# Patient Record
Sex: FEMALE | Race: WHITE | Employment: FULL TIME | URBAN - METROPOLITAN AREA
[De-identification: names, ages, dates, MRNs, and addresses within clinical notes are randomized per-mention and may not be internally consistent; named-entity substitution may affect disease eponyms.]

---

## 2017-01-24 RX ORDER — SIMVASTATIN 40 MG
TABLET ORAL
Qty: 90 TABLET | Refills: 0 | Status: SHIPPED | OUTPATIENT
Start: 2017-01-24 | End: 2017-04-25

## 2017-01-24 NOTE — TELEPHONE ENCOUNTER
Cholesterol Medications: Refilled per protocol    Protocol Criteria:  · Appointment scheduled in the past 12 months or in the next 3 months  · ALT & LDL on file in the past 12 months  · ALT result < 80  · LDL result <130         Lab Results  Component Valu

## 2017-03-03 ENCOUNTER — TELEPHONE (OUTPATIENT)
Dept: INTERNAL MEDICINE CLINIC | Facility: CLINIC | Age: 62
End: 2017-03-03

## 2017-03-03 DIAGNOSIS — E78.00 PURE HYPERCHOLESTEROLEMIA: Primary | ICD-10-CM

## 2017-03-03 NOTE — TELEPHONE ENCOUNTER
Is requesting to have labs orders mailed out to her home please. Dr. Russel Hsu which labs he wants.

## 2017-03-16 ENCOUNTER — TELEPHONE (OUTPATIENT)
Dept: INTERNAL MEDICINE CLINIC | Facility: CLINIC | Age: 62
End: 2017-03-16

## 2017-04-06 ENCOUNTER — OFFICE VISIT (OUTPATIENT)
Dept: INTERNAL MEDICINE CLINIC | Facility: CLINIC | Age: 62
End: 2017-04-06

## 2017-04-06 VITALS
HEART RATE: 77 BPM | BODY MASS INDEX: 33 KG/M2 | WEIGHT: 179.13 LBS | DIASTOLIC BLOOD PRESSURE: 83 MMHG | SYSTOLIC BLOOD PRESSURE: 143 MMHG | TEMPERATURE: 99 F

## 2017-04-06 DIAGNOSIS — J01.00 ACUTE NON-RECURRENT MAXILLARY SINUSITIS: Primary | ICD-10-CM

## 2017-04-06 PROCEDURE — 99212 OFFICE O/P EST SF 10 MIN: CPT | Performed by: INTERNAL MEDICINE

## 2017-04-06 PROCEDURE — 99214 OFFICE O/P EST MOD 30 MIN: CPT | Performed by: INTERNAL MEDICINE

## 2017-04-06 RX ORDER — BENZONATATE 200 MG/1
200 CAPSULE ORAL 3 TIMES DAILY PRN
Qty: 12 CAPSULE | Refills: 0 | Status: SHIPPED | OUTPATIENT
Start: 2017-04-06 | End: 2017-06-05 | Stop reason: ALTCHOICE

## 2017-04-06 RX ORDER — AMOXICILLIN 875 MG/1
875 TABLET, COATED ORAL 2 TIMES DAILY
Qty: 20 TABLET | Refills: 0 | Status: SHIPPED | OUTPATIENT
Start: 2017-04-06 | End: 2017-04-16

## 2017-04-06 NOTE — PROGRESS NOTES
HPI:    Patient ID: Иван Mendez is a 64year old female. 1. Cough  This is a new problem. Episode onset: 2 weeks. The problem has been unchanged. The problem occurs constantly. The cough is productive of sputum.  Associated symptoms include head PREMARIN 0.3 MG OR TABS 1 TABLET DAILY Disp:  Rfl:      Allergies:  Codeine                     Comment:Other reaction(s): CODEINE  Meperidine                  Comment:Other reaction(s): MEPERIDINE HCL  No Known Allergies         PHYSICAL EXAM:   Physica breath sounds normal. No respiratory distress. She has no wheezes. - take cough Supressant - benzonatate 200 MG Oral Cap and antibiotic - amoxicillin 875 MG Oral Tab, as instructed. Please contact us if you have any further questions.   No orders of t

## 2017-04-25 RX ORDER — SIMVASTATIN 40 MG
TABLET ORAL
Qty: 90 TABLET | Refills: 0 | Status: SHIPPED | OUTPATIENT
Start: 2017-04-25 | End: 2017-07-20

## 2017-04-25 RX ORDER — LISINOPRIL 10 MG/1
TABLET ORAL
Qty: 90 TABLET | Refills: 0 | Status: SHIPPED | OUTPATIENT
Start: 2017-04-25 | End: 2017-07-20

## 2017-06-02 ENCOUNTER — TELEPHONE (OUTPATIENT)
Dept: FAMILY MEDICINE CLINIC | Facility: CLINIC | Age: 62
End: 2017-06-02

## 2017-06-02 RX ORDER — AMOXICILLIN 875 MG/1
875 TABLET, COATED ORAL 2 TIMES DAILY
Qty: 20 TABLET | Refills: 0 | Status: CANCELLED | OUTPATIENT
Start: 2017-06-02 | End: 2017-06-12

## 2017-06-02 RX ORDER — BENZONATATE 200 MG/1
200 CAPSULE ORAL 3 TIMES DAILY PRN
Qty: 12 CAPSULE | Refills: 0 | Status: CANCELLED | OUTPATIENT
Start: 2017-06-02

## 2017-06-02 NOTE — TELEPHONE ENCOUNTER
Actions Requested: Requesting ABX/cough meds as before treated 4/6/17 same s/s- Pharmacy pendingSituation/Background   Problem: sinus issues   Onset: 2 days ago   Associated Symptoms: runny nose,coughing-sometimes productive clear phlegm, denies fever,havi congestion) and fever  * Earache  * Sinus congestion (pressure, fullness) present > 10 days  * Nasal discharge present > 10 days  * Using nasal washes and pain medicine > 24 hours and sinus pain (lower forehead, cheekbone, or eye) persists  * Patient wants

## 2017-06-02 NOTE — TELEPHONE ENCOUNTER
Pt is calling state that her sinus infection has came back pt want to know if another prescription can be sent to the pharm

## 2017-06-03 RX ORDER — BENZONATATE 100 MG/1
100 CAPSULE ORAL 3 TIMES DAILY PRN
Qty: 21 CAPSULE | Refills: 0 | Status: SHIPPED | OUTPATIENT
Start: 2017-06-03 | End: 2017-06-13

## 2017-06-03 NOTE — TELEPHONE ENCOUNTER
Patient called to follow-up on cough medication. Advised patient of Dr Curtis Baig note and that Nurse sent message to doctor yesterday regarding a cough medication, but we have not received a response.   Patient states that coughing a lot and still requesting

## 2017-06-03 NOTE — TELEPHONE ENCOUNTER
Ok to give her tessalon perles 100mg po TID prn for cough #21; should see Dr Jose Ramon Laurent next week if symptoms persist/worsens. - - -

## 2017-06-05 ENCOUNTER — OFFICE VISIT (OUTPATIENT)
Dept: INTERNAL MEDICINE CLINIC | Facility: CLINIC | Age: 62
End: 2017-06-05

## 2017-06-05 ENCOUNTER — TELEPHONE (OUTPATIENT)
Dept: INTERNAL MEDICINE CLINIC | Facility: CLINIC | Age: 62
End: 2017-06-05

## 2017-06-05 VITALS
TEMPERATURE: 99 F | HEART RATE: 88 BPM | DIASTOLIC BLOOD PRESSURE: 85 MMHG | WEIGHT: 184 LBS | BODY MASS INDEX: 34 KG/M2 | SYSTOLIC BLOOD PRESSURE: 164 MMHG | OXYGEN SATURATION: 99 %

## 2017-06-05 DIAGNOSIS — J40 BRONCHITIS: Primary | ICD-10-CM

## 2017-06-05 PROCEDURE — 99213 OFFICE O/P EST LOW 20 MIN: CPT | Performed by: INTERNAL MEDICINE

## 2017-06-05 PROCEDURE — 99212 OFFICE O/P EST SF 10 MIN: CPT | Performed by: INTERNAL MEDICINE

## 2017-06-05 RX ORDER — AMOXICILLIN AND CLAVULANATE POTASSIUM 875; 125 MG/1; MG/1
1 TABLET, FILM COATED ORAL 2 TIMES DAILY
Qty: 20 TABLET | Refills: 0 | Status: SHIPPED | OUTPATIENT
Start: 2017-06-05 | End: 2017-06-15

## 2017-06-05 RX ORDER — BENZONATATE 200 MG/1
200 CAPSULE ORAL 3 TIMES DAILY PRN
Qty: 12 CAPSULE | Refills: 0 | Status: SHIPPED | OUTPATIENT
Start: 2017-06-05 | End: 2017-06-13

## 2017-06-05 NOTE — TELEPHONE ENCOUNTER
Patient called in stating that medicine was prescribed to her and she is still having symptoms of a cough - please advise. benzonatate (TESSALON PERLES) 100 MG Oral Cap Take 1 capsule (100 mg total) by mouth 3 (three) times daily as needed for cough.  Dis

## 2017-06-05 NOTE — PROGRESS NOTES
HPI:    Patient ID: Ryan Danielle is a 64year old female. Cough  This is a recurrent problem. The current episode started in the past 7 days. The problem has been unchanged. The problem occurs constantly. The cough is non-productive.  Pertinent Allergies:  Codeine                     Comment:Other reaction(s): CODEINE  Meperidine                  Comment:Other reaction(s): MEPERIDINE HCL  No Known Allergies         PHYSICAL EXAM:   Physical Exam   Constitutional: She appears well-developed an record entries made by the scribe were at my direction and in my presence. I have reviewed the chart and discharge instructions (if applicable) and agree that the record reflects my personal performance and is accurate and complete.   Guille Goddard MD, 6

## 2017-06-13 ENCOUNTER — TELEPHONE (OUTPATIENT)
Dept: INTERNAL MEDICINE CLINIC | Facility: CLINIC | Age: 62
End: 2017-06-13

## 2017-06-13 DIAGNOSIS — R05.9 COUGH: Primary | ICD-10-CM

## 2017-06-13 RX ORDER — BENZONATATE 200 MG/1
200 CAPSULE ORAL 3 TIMES DAILY PRN
Qty: 15 CAPSULE | Refills: 0 | Status: SHIPPED | OUTPATIENT
Start: 2017-06-13 | End: 2017-06-18

## 2017-06-13 NOTE — TELEPHONE ENCOUNTER
F/U call  LOV 6/5/17 prescribed abx and cough suppressant. Completed therapy 3 days ago and cough lingers w/ H/A. Pt would like more medication to help with cough and symptoms  Declines appt at this time.

## 2017-06-13 NOTE — TELEPHONE ENCOUNTER
Patient notified of MD's recommendation. Patient verbalized understanding. Orders sent. Patient intends to have CXR tomorrow.

## 2017-06-15 ENCOUNTER — HOSPITAL ENCOUNTER (OUTPATIENT)
Dept: GENERAL RADIOLOGY | Age: 62
Discharge: HOME | End: 2017-06-15
Attending: INTERNAL MEDICINE
Payer: COMMERCIAL

## 2017-06-15 DIAGNOSIS — R05.9 COUGH: ICD-10-CM

## 2017-06-15 PROCEDURE — 71020 XR CHEST PA + LAT CHEST (CPT=71020): CPT | Performed by: INTERNAL MEDICINE

## 2017-06-21 ENCOUNTER — TELEPHONE (OUTPATIENT)
Dept: INTERNAL MEDICINE CLINIC | Facility: CLINIC | Age: 62
End: 2017-06-21

## 2017-07-20 RX ORDER — SIMVASTATIN 40 MG
TABLET ORAL
Qty: 90 TABLET | Refills: 0 | Status: SHIPPED | OUTPATIENT
Start: 2017-07-20 | End: 2017-10-06

## 2017-07-20 RX ORDER — LISINOPRIL 10 MG/1
TABLET ORAL
Qty: 90 TABLET | Refills: 0 | Status: SHIPPED | OUTPATIENT
Start: 2017-07-20 | End: 2017-10-06

## 2017-10-06 RX ORDER — LISINOPRIL 10 MG/1
TABLET ORAL
Qty: 90 TABLET | Refills: 0 | Status: SHIPPED | OUTPATIENT
Start: 2017-10-06 | End: 2018-01-09

## 2017-10-06 RX ORDER — SIMVASTATIN 40 MG
TABLET ORAL
Qty: 90 TABLET | Refills: 0 | Status: SHIPPED | OUTPATIENT
Start: 2017-10-06 | End: 2018-01-09

## 2018-01-09 RX ORDER — LISINOPRIL 10 MG/1
TABLET ORAL
Qty: 90 TABLET | Refills: 1 | Status: SHIPPED | OUTPATIENT
Start: 2018-01-09 | End: 2018-07-18

## 2018-01-09 RX ORDER — SIMVASTATIN 40 MG
TABLET ORAL
Qty: 90 TABLET | Refills: 1 | Status: SHIPPED | OUTPATIENT
Start: 2018-01-09 | End: 2018-07-16

## 2018-01-09 NOTE — TELEPHONE ENCOUNTER
Please advise on refill request.    Hypertensive Medications  Protocol Criteria:  · Appointment scheduled in the past 6 months or in the next 3 months  · BMP or CMP in the past 12 months  · Creatinine result < 2  Recent Outpatient Visits            7 month Visit    2 years ago Essential hypertension, benign    [facility] Clarissa Laurent MD    Office Visit    3 years ago Routine general medical examination at a health care facility    [address] [place] [address]

## 2018-04-05 ENCOUNTER — TELEPHONE (OUTPATIENT)
Dept: INTERNAL MEDICINE CLINIC | Facility: CLINIC | Age: 63
End: 2018-04-05

## 2018-04-05 DIAGNOSIS — I10 ESSENTIAL HYPERTENSION, BENIGN: ICD-10-CM

## 2018-04-05 DIAGNOSIS — E78.00 PURE HYPERCHOLESTEROLEMIA: Primary | ICD-10-CM

## 2018-04-05 NOTE — TELEPHONE ENCOUNTER
Dr. Yola Garcia Pt would like lab order before her med refill request. LOV 6/15/17 bronchitis, last labs 4/24/17 for AST, ALT, lipids.

## 2018-04-05 NOTE — TELEPHONE ENCOUNTER
Pt stts prior to her refill pcp wants labs drawn  Pt stts her refill is due and she is requesting to have lab orders   Please advise on orders

## 2018-07-18 NOTE — TELEPHONE ENCOUNTER
Pt called and was informed of message below. Pt becomes upset stating she had complete blood work done through [employer] and spent $277.  CSS does not see where the results were sent to the office and informed Pt to contact [employer] and have them send t

## 2018-07-18 NOTE — TELEPHONE ENCOUNTER
CSS please assist patient in scheduling a follow up appointment - thank you     Cholesterol Medications  Protocol Criteria:  · Appointment scheduled in the past 12 months or in the next 3 months  · ALT & LDL on file in the past 12 months  · ALT result < 80

## 2018-07-20 NOTE — TELEPHONE ENCOUNTER
Office staff=please check fax for the lab test resulst from [address] [place].    Dr FERNANDO=med pended for approval.  Future Appointments  Date Time Provider Mary Coronado   8/2/2018 10:30 AM Elio Schumacher MD [facility]

## 2018-07-23 RX ORDER — SIMVASTATIN 40 MG
TABLET ORAL
Qty: 90 TABLET | Refills: 0 | Status: SHIPPED | OUTPATIENT
Start: 2018-07-23 | End: 2018-10-23

## 2018-07-23 RX ORDER — LISINOPRIL 10 MG/1
TABLET ORAL
Qty: 90 TABLET | Refills: 0 | Status: SHIPPED | OUTPATIENT
Start: 2018-07-23 | End: 2018-10-23

## 2018-07-23 RX ORDER — SIMVASTATIN 40 MG
TABLET ORAL
Qty: 90 TABLET | Refills: 1 | OUTPATIENT
Start: 2018-07-23

## 2018-07-24 NOTE — TELEPHONE ENCOUNTER
.  Pending Prescriptions Disp Refills    simvastatin 40 MG Oral Tab 90 tablet 1     Sig: TAKE ONE TABLET BY MOUTH NIGHTLY         Filled by RN today 7-23-18. Leona Spar

## 2018-07-25 NOTE — TELEPHONE ENCOUNTER
Pt states that her rx was never received at pharm. I called in to verbally refill the simvastatin, but the pharm states that both her simvastatin and her lisinopril are ready for .

## 2018-08-02 ENCOUNTER — OFFICE VISIT (OUTPATIENT)
Dept: INTERNAL MEDICINE CLINIC | Facility: CLINIC | Age: 63
End: 2018-08-02
Payer: COMMERCIAL

## 2018-08-02 VITALS
SYSTOLIC BLOOD PRESSURE: 120 MMHG | DIASTOLIC BLOOD PRESSURE: 75 MMHG | HEIGHT: 62 IN | WEIGHT: 182.38 LBS | BODY MASS INDEX: 33.56 KG/M2 | TEMPERATURE: 99 F | HEART RATE: 76 BPM

## 2018-08-02 DIAGNOSIS — E78.5 HYPERLIPIDEMIA, UNSPECIFIED HYPERLIPIDEMIA TYPE: ICD-10-CM

## 2018-08-02 DIAGNOSIS — I10 ESSENTIAL HYPERTENSION: Primary | ICD-10-CM

## 2018-08-02 PROBLEM — J40 BRONCHITIS: Status: RESOLVED | Noted: 2017-06-05 | Resolved: 2018-08-02

## 2018-08-02 PROCEDURE — 99212 OFFICE O/P EST SF 10 MIN: CPT | Performed by: INTERNAL MEDICINE

## 2018-08-02 PROCEDURE — 99214 OFFICE O/P EST MOD 30 MIN: CPT | Performed by: INTERNAL MEDICINE

## 2018-08-02 NOTE — PROGRESS NOTES
HPI:    Patient ID: Oneal Smith is a 58year old female. Hypertension   This is a chronic problem. The current episode started more than 1 year ago. The problem has been gradually improving since onset. The problem is controlled.  Pertinent nega Mother    • Lipids Mother       Smoking status: Former Smoker                                                              Packs/day: 0.00      Years: 0.00      Alcohol use: Yes           0.0 oz/week     Comment: socially              Current Outpatient Pr 12/09/2016   TRIG 75 08/21/2013   TRIGLY 98 01/18/2010   TRIGLY 64 03/02/2006       Lab Results  Component Value Date   LDL 96 04/24/2017    (H) 12/09/2016    08/21/2013       Lab Results  Component Value Date   AST 42 (H) 04/24/2017   AST 32

## 2018-10-24 NOTE — TELEPHONE ENCOUNTER
Failed per nursing protocol - please advise on refill request     Cholesterol Medications  Protocol Criteria:  · Appointment scheduled in the past 12 months or in the next 3 months  · ALT & LDL on file in the past 12 months  · ALT result < 80  · LDL resul Date    GLU 93 12/09/2016    BUN 11 12/09/2016    CREATSERUM 0.48 (L) 12/09/2016    GFRNAA 107 12/09/2016    GFRAA 124 12/09/2016    CA 9.6 12/09/2016    AST 42 (H) 04/24/2017    ALT 41 (H) 04/24/2017    BILT 0.5 12/09/2016    TP 7.1 12/09/2016    ALB 4.3

## 2018-10-25 RX ORDER — LISINOPRIL 10 MG/1
TABLET ORAL
Qty: 90 TABLET | Refills: 0 | Status: SHIPPED | OUTPATIENT
Start: 2018-10-25 | End: 2019-07-29

## 2018-10-25 RX ORDER — SIMVASTATIN 40 MG
TABLET ORAL
Qty: 90 TABLET | Refills: 0 | Status: SHIPPED | OUTPATIENT
Start: 2018-10-25 | End: 2019-01-31

## 2019-02-02 RX ORDER — SIMVASTATIN 40 MG
TABLET ORAL
Qty: 90 TABLET | Refills: 0 | Status: SHIPPED | OUTPATIENT
Start: 2019-02-02 | End: 2019-04-22

## 2019-04-22 DIAGNOSIS — I10 ESSENTIAL HYPERTENSION, BENIGN: ICD-10-CM

## 2019-04-22 DIAGNOSIS — E78.00 PURE HYPERCHOLESTEROLEMIA: Primary | ICD-10-CM

## 2019-04-23 RX ORDER — SIMVASTATIN 40 MG
TABLET ORAL
Qty: 90 TABLET | Refills: 0 | Status: SHIPPED | OUTPATIENT
Start: 2019-04-23 | End: 2019-07-23

## 2019-05-17 ENCOUNTER — APPOINTMENT (OUTPATIENT)
Dept: LAB | Age: 64
End: 2019-05-17
Attending: PATHOLOGY
Payer: COMMERCIAL

## 2019-05-17 DIAGNOSIS — I10 ESSENTIAL HYPERTENSION, BENIGN: ICD-10-CM

## 2019-05-17 DIAGNOSIS — E78.00 PURE HYPERCHOLESTEROLEMIA: ICD-10-CM

## 2019-05-17 PROCEDURE — 80053 COMPREHEN METABOLIC PANEL: CPT

## 2019-05-17 PROCEDURE — 36415 COLL VENOUS BLD VENIPUNCTURE: CPT

## 2019-05-17 PROCEDURE — 80061 LIPID PANEL: CPT

## 2019-05-23 ENCOUNTER — OFFICE VISIT (OUTPATIENT)
Dept: INTERNAL MEDICINE CLINIC | Facility: CLINIC | Age: 64
End: 2019-05-23
Payer: COMMERCIAL

## 2019-05-23 VITALS
BODY MASS INDEX: 31 KG/M2 | HEART RATE: 81 BPM | DIASTOLIC BLOOD PRESSURE: 86 MMHG | TEMPERATURE: 99 F | SYSTOLIC BLOOD PRESSURE: 149 MMHG | OXYGEN SATURATION: 98 % | WEIGHT: 171.19 LBS

## 2019-05-23 DIAGNOSIS — E78.00 PURE HYPERCHOLESTEROLEMIA: ICD-10-CM

## 2019-05-23 DIAGNOSIS — J20.9 ACUTE BRONCHITIS, UNSPECIFIED ORGANISM: Primary | ICD-10-CM

## 2019-05-23 DIAGNOSIS — I10 ESSENTIAL HYPERTENSION, BENIGN: ICD-10-CM

## 2019-05-23 PROCEDURE — 99214 OFFICE O/P EST MOD 30 MIN: CPT | Performed by: INTERNAL MEDICINE

## 2019-05-23 PROCEDURE — 99212 OFFICE O/P EST SF 10 MIN: CPT | Performed by: INTERNAL MEDICINE

## 2019-05-23 RX ORDER — AZITHROMYCIN 250 MG/1
TABLET, FILM COATED ORAL
Qty: 6 TABLET | Refills: 0 | Status: SHIPPED | OUTPATIENT
Start: 2019-05-23 | End: 2019-12-09 | Stop reason: ALTCHOICE

## 2019-05-23 RX ORDER — BENZONATATE 200 MG/1
200 CAPSULE ORAL 3 TIMES DAILY PRN
Qty: 30 CAPSULE | Refills: 0 | Status: SHIPPED | OUTPATIENT
Start: 2019-05-23 | End: 2019-12-09 | Stop reason: ALTCHOICE

## 2019-05-23 NOTE — PROGRESS NOTES
HPI:    Patient ID: Gaither Bamberger is a 61year old female. Patient presents with:  Cough: c/o ongoing cough since January. Hypertension: fup needs refills.    Lab Results    HPI  Cough  Patient c/o persistent cough (productive of clear phlegm), co Genitourinary: Negative for difficulty urinating and dysuria. Musculoskeletal: Negative for gait problem and neck stiffness. Skin: Negative for color change and pallor.    Neurological: Negative for dizziness, tremors, facial asymmetry and speech diff swelling or tenderness. No foreign bodies. Left Ear: Tympanic membrane, external ear and ear canal normal. No swelling or tenderness. No foreign bodies.    Mouth/Throat: Uvula is midline, oropharynx is clear and moist and mucous membranes are normal. No o 05/17/2019    TRIG 130 04/24/2017    TRIG 92 12/09/2016    TRIGLY 98 01/18/2010    TRIGLY 64 03/02/2006     Lab Results   Component Value Date    AST 51 (H) 05/17/2019    AST 42 (H) 04/24/2017    AST 32 12/09/2016     Lab Results   Component Value Date tablet daily. • benzonatate 200 MG Oral Cap 30 capsule 0     Sig: Take 1 capsule (200 mg total) by mouth 3 (three) times daily as needed for cough.        Imaging & Referrals:  None       ID#[id number]  By signing my name below, silvia Mock

## 2019-07-23 RX ORDER — SIMVASTATIN 40 MG
TABLET ORAL
Qty: 90 TABLET | Refills: 0 | Status: SHIPPED | OUTPATIENT
Start: 2019-07-23 | End: 2019-11-22

## 2019-07-23 NOTE — TELEPHONE ENCOUNTER
Current Outpatient Medications:   ••  lisinopril 10 MG Oral Tab, TAKE 1 TABLET BY MOUTH ONCE DAILY, Disp: 90 tablet, Rfl: 0

## 2019-07-24 ENCOUNTER — TELEPHONE (OUTPATIENT)
Dept: FAMILY MEDICINE CLINIC | Facility: CLINIC | Age: 64
End: 2019-07-24

## 2019-07-29 RX ORDER — LISINOPRIL 10 MG/1
TABLET ORAL
Qty: 90 TABLET | Refills: 0 | Status: SHIPPED | OUTPATIENT
Start: 2019-07-29 | End: 2019-11-22

## 2019-07-29 NOTE — TELEPHONE ENCOUNTER
Patient called stating she received a message informing her that prescription was denied as her previous provider has retired. Informed her she would need to establish care with the new PCP.  Patient became very upset and stated she will not come in as she

## 2019-07-29 NOTE — TELEPHONE ENCOUNTER
90 day supply of Lisinopril 10 mg sent to patient's pharmacy. Patient informed 90 day supply was sent and that she would need to establish care with the new provider for further refills of this medication. Patient verbalized understanding.      Refill passe

## 2019-07-30 ENCOUNTER — TELEPHONE (OUTPATIENT)
Dept: FAMILY MEDICINE CLINIC | Facility: CLINIC | Age: 64
End: 2019-07-30

## 2019-07-30 NOTE — TELEPHONE ENCOUNTER
Refill addressed    lisinopril 10 MG Oral Tab 90 tablet 0 7/29/2019     Sig: TAKE 1 TABLET BY MOUTH ONCE DAILY    Sent to pharmacy as: Lisinopril 10 MG Oral Tablet    E-Prescribing Status: Receipt confirmed by pharmacy (7/29/2019  1:19 PM CDT)    Pharmacy

## 2019-07-30 NOTE — TELEPHONE ENCOUNTER
Current Outpatient Medications:   •  lisinopril 10 MG Oral Tab, TAKE 1 TABLET BY MOUTH ONCE DAILY, Disp: 90 tablet, Rfl: 0

## 2019-11-21 NOTE — TELEPHONE ENCOUNTER
Patient called in for refills on medications below. She is scheduled for annual physical on 12/9 (when she comes back from being out of town). She states that she is almost out of medication. She is leaving on 11/27 to travel out of town.      Pharmacy

## 2019-11-22 RX ORDER — SIMVASTATIN 40 MG
TABLET ORAL
Qty: 30 TABLET | Refills: 0 | Status: SHIPPED | OUTPATIENT
Start: 2019-11-22 | End: 2019-12-09

## 2019-11-22 RX ORDER — LISINOPRIL 10 MG/1
TABLET ORAL
Qty: 30 TABLET | Refills: 0 | Status: SHIPPED | OUTPATIENT
Start: 2019-11-22 | End: 2019-12-09

## 2019-11-22 NOTE — TELEPHONE ENCOUNTER
Dr Rosa Garcia, Passed protocol, but please advise, she was told in July to see you and given 90-day, she is now scheduled to see you 12/9/19. Medicines pended for 30 days. Refill passed per [facility] protocol.  Only given 1 month supply as she needs to months or in the next 3 months  · ALT & LDL on file in the past 12 months  · ALT result < 80  · LDL result <130   Recent Outpatient Visits            6 months ago Acute bronchitis, unspecified organism    [facility], [facility] T

## 2019-12-09 ENCOUNTER — OFFICE VISIT (OUTPATIENT)
Dept: INTERNAL MEDICINE CLINIC | Facility: CLINIC | Age: 64
End: 2019-12-09

## 2019-12-09 VITALS
WEIGHT: 171 LBS | HEART RATE: 90 BPM | TEMPERATURE: 100 F | DIASTOLIC BLOOD PRESSURE: 87 MMHG | SYSTOLIC BLOOD PRESSURE: 164 MMHG | HEIGHT: 62 IN | BODY MASS INDEX: 31.47 KG/M2

## 2019-12-09 DIAGNOSIS — Z13.6 SCREENING, ISCHEMIC HEART DISEASE: ICD-10-CM

## 2019-12-09 DIAGNOSIS — Z12.11 COLON CANCER SCREENING: ICD-10-CM

## 2019-12-09 DIAGNOSIS — I10 HYPERTENSION GOAL BP (BLOOD PRESSURE) < 130/80: ICD-10-CM

## 2019-12-09 DIAGNOSIS — E78.5 DYSLIPIDEMIA: ICD-10-CM

## 2019-12-09 DIAGNOSIS — Z28.21 INFLUENZA VACCINE REFUSED: ICD-10-CM

## 2019-12-09 DIAGNOSIS — T16.1XXA FOREIGN BODY OF RIGHT EAR, INITIAL ENCOUNTER: ICD-10-CM

## 2019-12-09 DIAGNOSIS — Z00.00 ANNUAL PHYSICAL EXAM: Primary | ICD-10-CM

## 2019-12-09 PROCEDURE — 99396 PREV VISIT EST AGE 40-64: CPT | Performed by: INTERNAL MEDICINE

## 2019-12-09 RX ORDER — LISINOPRIL 10 MG/1
10 TABLET ORAL DAILY
Qty: 90 TABLET | Refills: 1 | Status: SHIPPED | OUTPATIENT
Start: 2019-12-09 | End: 2020-09-17

## 2019-12-09 RX ORDER — SIMVASTATIN 40 MG
40 TABLET ORAL NIGHTLY
Qty: 90 TABLET | Refills: 1 | Status: SHIPPED | OUTPATIENT
Start: 2019-12-09 | End: 2020-09-17

## 2019-12-09 NOTE — PROGRESS NOTES
HPI:    Patient ID: Rosina Molina is a 61year old female. Chief Complaint: Physical (Needs meds refilled. )      HPI   Pleasant 59-year-old female who presents for annual physical exam and for medication refills.   She has no complaints and feels w TEODORO with BSO 2ry to fibroids      Reviewed:  Social History    Tobacco Use      Smoking status: Former Smoker        Packs/day: 0.50        Years: 40.00        Pack years: 21        Quit date: 2002        Years since quittin.0      Smokeless t Normocephalic and atraumatic. Right Ear: Hearing and external ear normal.   Left Ear: Hearing and external ear normal.   Nose: Nose normal.   Mouth/Throat: Oropharynx is clear and moist. No oropharyngeal exudate.    Circular foreign body in right ear [place] Refill: 1  Plan  Chronic problem. Tolerating lisinopril 10 mg without any side effects.   Blood pressure uncontrolled today in office, she states is controlled at home, will have her check at home for the next 2 to 3 weeks and if greater than 130/80 follow questions.      ----------------------------------------- END NOTE-----------------------------------------     There are no Patient Instructions on file for this visit.

## 2020-09-15 DIAGNOSIS — E78.5 DYSLIPIDEMIA: ICD-10-CM

## 2020-09-15 DIAGNOSIS — I10 HYPERTENSION GOAL BP (BLOOD PRESSURE) < 130/80: ICD-10-CM

## 2020-09-17 RX ORDER — LISINOPRIL 10 MG/1
TABLET ORAL
Qty: 90 TABLET | Refills: 0 | Status: SHIPPED | OUTPATIENT
Start: 2020-09-17 | End: 2021-03-01

## 2020-09-17 RX ORDER — SIMVASTATIN 40 MG
TABLET ORAL
Qty: 90 TABLET | Refills: 0 | Status: SHIPPED | OUTPATIENT
Start: 2020-09-17 | End: 2021-03-01

## 2020-09-28 ENCOUNTER — NURSE TRIAGE (OUTPATIENT)
Dept: INTERNAL MEDICINE CLINIC | Facility: CLINIC | Age: 65
End: 2020-09-28

## 2020-09-28 DIAGNOSIS — K61.2 ABSCESS OF ANAL AND RECTAL REGIONS: Primary | ICD-10-CM

## 2020-09-28 NOTE — TELEPHONE ENCOUNTER
WAYNE Ceja, for Dr Tereza Olmedo, please note. Patient developed a \"cyst\" at the rectum, about 3 inches, closed, very painful rated 8 and it is growing and worsening, difficulty sitting and moving around with it.  She stated she had a temperature of 99.9 last nig

## 2020-09-30 NOTE — TELEPHONE ENCOUNTER
I called the patient and was given the options. She does not want to come to the [facility] everyday. I stated she can contact the surgeon Akilah Martinez at   [phone] per internet phone number .   It appears Dr. Antonieta Landeros is a ED physici

## 2020-09-30 NOTE — TELEPHONE ENCOUNTER
Please advise where the patient can be seen? Wound Clinic? The patient calling to state that she was seen at an outside  ED for an anal abscess.   The ED surgeon who she went to twice stated the area needs to be packed every day or every other day for

## 2020-10-24 ENCOUNTER — MED REC SCAN ONLY (OUTPATIENT)
Dept: INTERNAL MEDICINE CLINIC | Facility: CLINIC | Age: 65
End: 2020-10-24

## 2021-02-25 DIAGNOSIS — I10 HYPERTENSION GOAL BP (BLOOD PRESSURE) < 130/80: ICD-10-CM

## 2021-02-25 DIAGNOSIS — E78.5 DYSLIPIDEMIA: ICD-10-CM

## 2021-02-26 RX ORDER — LISINOPRIL 10 MG/1
TABLET ORAL
Qty: 90 TABLET | Refills: 0 | OUTPATIENT
Start: 2021-02-26

## 2021-02-26 RX ORDER — SIMVASTATIN 40 MG
TABLET ORAL
Qty: 90 TABLET | Refills: 0 | OUTPATIENT
Start: 2021-02-26

## 2021-02-26 NOTE — TELEPHONE ENCOUNTER
Patient is calling to follow up and scheduled in office follow with Dr. Oz Kennedy for 03/02/2021. Patient refused to scheduled virtual visit and states she rather come in.      Patient is requesting scripts be refilled until her visit because she is out of the

## 2021-02-26 NOTE — TELEPHONE ENCOUNTER
Please schedule epic video visit to discuss these medications. Should be tracking blood pressure daily. Thanks.

## 2021-02-28 NOTE — TELEPHONE ENCOUNTER
Please advise on refill;  Patient is out of medication.     Future Appointments   Date Time Provider Mary Coronado   3/2/2021 11:20 AM MD DREAD Kemp

## 2021-03-01 RX ORDER — SIMVASTATIN 40 MG
40 TABLET ORAL NIGHTLY
Qty: 90 TABLET | Refills: 0 | Status: SHIPPED | OUTPATIENT
Start: 2021-03-01 | End: 2021-03-02

## 2021-03-01 RX ORDER — LISINOPRIL 10 MG/1
10 TABLET ORAL DAILY
Qty: 90 TABLET | Refills: 0 | Status: SHIPPED | OUTPATIENT
Start: 2021-03-01 | End: 2021-03-02

## 2021-03-02 ENCOUNTER — OFFICE VISIT (OUTPATIENT)
Dept: INTERNAL MEDICINE CLINIC | Facility: CLINIC | Age: 66
End: 2021-03-02
Payer: MEDICARE

## 2021-03-02 VITALS
BODY MASS INDEX: 31.1 KG/M2 | HEIGHT: 62 IN | WEIGHT: 169 LBS | TEMPERATURE: 98 F | DIASTOLIC BLOOD PRESSURE: 81 MMHG | HEART RATE: 86 BPM | SYSTOLIC BLOOD PRESSURE: 166 MMHG

## 2021-03-02 DIAGNOSIS — I10 HYPERTENSION GOAL BP (BLOOD PRESSURE) < 130/80: ICD-10-CM

## 2021-03-02 DIAGNOSIS — E78.5 DYSLIPIDEMIA: ICD-10-CM

## 2021-03-02 PROCEDURE — 99214 OFFICE O/P EST MOD 30 MIN: CPT | Performed by: INTERNAL MEDICINE

## 2021-03-02 RX ORDER — SIMVASTATIN 40 MG
40 TABLET ORAL NIGHTLY
Qty: 90 TABLET | Refills: 1 | Status: SHIPPED | OUTPATIENT
Start: 2021-03-02 | End: 2021-06-07 | Stop reason: DRUGHIGH

## 2021-03-02 RX ORDER — LISINOPRIL 10 MG/1
10 TABLET ORAL 2 TIMES DAILY
Qty: 180 TABLET | Refills: 1 | Status: SHIPPED | OUTPATIENT
Start: 2021-03-02 | End: 2021-06-07

## 2021-03-02 NOTE — PROGRESS NOTES
History of Present Illness   Patient ID: Chetan Silver is a 72year old female. Chief Complaint: Follow - Up (medication review)      Hypertension  This is a chronic problem. The current episode started more than 1 year ago.  The problem is [employer] affect. Assessment & Plan    1. Hypertension goal BP (blood pressure) < 463/51  - BASIC METABOLIC PANEL (8); Future  - lisinopril 10 MG Oral Tab; Take 1 tablet (10 mg total) by mouth 2 (two) times a day. FOR BLOOD PRESSURE. Dispense: 180 tablet;  Ref CHOLHDLRATIO 2.2 04/24/2017     The ASCVD Risk score (Nilay Landry, et al., 2013) failed to calculate for the following reasons:     The valid HDL cholesterol range is 20 to 100 mg/dL    Medical History    Reviewed Active Problems:  Patient Active Problem

## 2021-05-26 ENCOUNTER — TELEPHONE (OUTPATIENT)
Dept: INTERNAL MEDICINE CLINIC | Facility: CLINIC | Age: 66
End: 2021-05-26

## 2021-05-26 DIAGNOSIS — E78.5 DYSLIPIDEMIA: ICD-10-CM

## 2021-05-26 DIAGNOSIS — I10 HYPERTENSION GOAL BP (BLOOD PRESSURE) < 130/80: Primary | ICD-10-CM

## 2021-05-26 NOTE — TELEPHONE ENCOUNTER
-  Patient needs blood work ordered for [employer]. I re-ordered and re-pended them.   Thanks   I have her scheduled for a f/u 6-7-21    (she will need the printed copy mailed to home address)

## 2021-06-07 ENCOUNTER — OFFICE VISIT (OUTPATIENT)
Dept: INTERNAL MEDICINE CLINIC | Facility: CLINIC | Age: 66
End: 2021-06-07
Payer: MEDICARE

## 2021-06-07 VITALS
SYSTOLIC BLOOD PRESSURE: 134 MMHG | TEMPERATURE: 99 F | HEIGHT: 62 IN | DIASTOLIC BLOOD PRESSURE: 80 MMHG | WEIGHT: 165.38 LBS | HEART RATE: 109 BPM | BODY MASS INDEX: 30.44 KG/M2

## 2021-06-07 DIAGNOSIS — R13.10 DYSPHAGIA, UNSPECIFIED TYPE: ICD-10-CM

## 2021-06-07 DIAGNOSIS — Z12.11 COLON CANCER SCREENING: ICD-10-CM

## 2021-06-07 DIAGNOSIS — E78.5 DYSLIPIDEMIA: ICD-10-CM

## 2021-06-07 DIAGNOSIS — I10 HYPERTENSION GOAL BP (BLOOD PRESSURE) < 130/80: Primary | ICD-10-CM

## 2021-06-07 DIAGNOSIS — Z01.00 ROUTINE EYE EXAM: ICD-10-CM

## 2021-06-07 PROCEDURE — 99214 OFFICE O/P EST MOD 30 MIN: CPT | Performed by: INTERNAL MEDICINE

## 2021-06-07 RX ORDER — ROSUVASTATIN CALCIUM 10 MG/1
10 TABLET, COATED ORAL NIGHTLY
Qty: 90 TABLET | Refills: 1 | Status: SHIPPED | OUTPATIENT
Start: 2021-06-07

## 2021-06-07 RX ORDER — LISINOPRIL 10 MG/1
TABLET ORAL
Qty: 270 TABLET | Refills: 1 | Status: SHIPPED | OUTPATIENT
Start: 2021-06-07 | End: 2021-09-05

## 2021-06-07 NOTE — PROGRESS NOTES
History of Present Illness   Patient ID: Karthik Loving is a 72year old female. Chief Complaint: Hypertension (review labs)      Hypertension  This is a chronic problem. The current episode started more than 1 year ago.  The problem has been gradual Rate and Rhythm: Normal rate. Heart sounds: Normal heart sounds. Pulmonary:      Effort: Pulmonary effort is normal.   Abdominal:      Palpations: Abdomen is soft. Musculoskeletal:         General: Normal range of motion.       Cervical back: Norm gastroenterology today and to get the next available appointment to discuss endoscopy, she is scared of going to the hospital.    5. Routine eye exam  - [facility]  As above            Follow Up:   Return in about 1 month (around 7/7/20 Smokeless tobacco: Never Used    Vaping Use      Vaping Use: Never used    Alcohol use:  Yes      Alcohol/week: 0.0 standard drinks      Comment: socially    Drug use: No     Reviewed Current Medications:  Current Outpatient Medications   Medication Sig Dis

## 2021-07-01 ENCOUNTER — NURSE TRIAGE (OUTPATIENT)
Dept: INTERNAL MEDICINE CLINIC | Facility: CLINIC | Age: 66
End: 2021-07-01

## 2021-07-01 NOTE — TELEPHONE ENCOUNTER
Action Requested: Summary for Provider     []  Critical Lab, Recommendations Needed  [] Need Additional Advice  [x]   FYI    []   Need Orders  [] Need Medications Sent to Pharmacy  []  Other     SUMMARY: Appointment to be seen today per protocol.    Injured

## 2021-07-02 NOTE — TELEPHONE ENCOUNTER
No call back from patient. Again no answer, lmtcb for update on eye injury and re-schedule appt if needed. Second call attempt from triage staff.     Please reply to pool: FDIE Bell

## 2021-10-13 ENCOUNTER — MED REC SCAN ONLY (OUTPATIENT)
Dept: INTERNAL MEDICINE CLINIC | Facility: CLINIC | Age: 66
End: 2021-10-13

## 2022-01-19 ENCOUNTER — TELEPHONE (OUTPATIENT)
Dept: INTERNAL MEDICINE CLINIC | Facility: CLINIC | Age: 67
End: 2022-01-19

## 2022-01-19 NOTE — TELEPHONE ENCOUNTER
Patient is eligible for a 2022 Medicare Annual Wellness visit. Spoke with pt she will call back to schedule appt.

## 2022-03-24 NOTE — TELEPHONE ENCOUNTER
Please review. Protocol failed or has no protocol.     Requested Prescriptions   Pending Prescriptions Disp Refills    LISINOPRIL 10 MG Oral Tab [Pharmacy Med Name: Lisinopril 10 MG Oral Tablet] 270 tablet 0     Sig: TAKE 2 TABLETS (20 MG TOTAL) BY MOUTH ONCE DAILY AND 1 TABLET (10 MG TOTAL) NIGHTLY FOR BLOOD PRESSURE        Hypertensive Medications Protocol Failed - 3/24/2022  2:07 PM        Failed - Appointment in past 6 or next 3 months        Passed - CMP or BMP in past 12 months        Passed - GFR Non- > 50     Lab Results   Component Value Date    GFRNAA 107 06/02/2021                    ROSUVASTATIN 10 MG Oral Tab [Pharmacy Med Name: Rosuvastatin Calcium 10 MG Oral Tablet] 90 tablet 0     Sig: TAKE 1 TABLET (10 MG TOTAL) BY MOUTH NIGHTLY FOR CHOLESTEROL        Cholesterol Medication Protocol Passed - 3/24/2022  2:07 PM        Passed - ALT in past 12 months        Passed - LDL in past 12 months        Passed - Last ALT < 80       Lab Results   Component Value Date    ALT 50 (H) 06/02/2021             Passed - Last LDL < 130     Lab Results   Component Value Date    LDL 95 06/02/2021               Passed - Appointment in past 12 or next 3 months              Recent Outpatient Visits              9 months ago Hypertension goal BP (blood pressure) < 130/80    [facility] [place] [facility]    Office Visit    1 year ago Hypertension goal BP (blood pressure) < 130/80    [address] MD    Office Visit    2 years ago Annual physical exam    [address] MD    Office Visit    2 years ago Acute bronchitis, unspecified organism    [address] Wild MD    Office Visit    3 years ago Essential hypertension    [facility], [address] [address], Shirley Mistry MD    Office Visit

## 2022-03-29 RX ORDER — ROSUVASTATIN CALCIUM 10 MG/1
TABLET, COATED ORAL
Qty: 90 TABLET | Refills: 1 | Status: SHIPPED | OUTPATIENT
Start: 2022-03-29

## 2022-03-29 RX ORDER — LISINOPRIL 10 MG/1
TABLET ORAL
Qty: 270 TABLET | Refills: 1 | Status: SHIPPED | OUTPATIENT
Start: 2022-03-29

## 2022-06-10 ENCOUNTER — MED REC SCAN ONLY (OUTPATIENT)
Dept: INTERNAL MEDICINE CLINIC | Facility: CLINIC | Age: 67
End: 2022-06-10

## 2022-08-17 ENCOUNTER — TELEPHONE (OUTPATIENT)
Dept: INTERNAL MEDICINE CLINIC | Facility: CLINIC | Age: 67
End: 2022-08-17

## 2022-08-17 NOTE — TELEPHONE ENCOUNTER
[facility] office requesting callback to discuss if this patient has had any testing done due to having surgery on 8/18/2022, requesting a medical clearance and testing.

## 2022-08-17 NOTE — TELEPHONE ENCOUNTER
Verified name and  of tere Mackey from Dr. Talia Bustillo office (retinal surgeon) calling to check for recent lab and EKG work. She was advised that patient has not been in to see Dr. Ingrid Dunn since 2021 and that there are no recent labs or EKG.

## 2023-01-31 ENCOUNTER — TELEPHONE (OUTPATIENT)
Dept: INTERNAL MEDICINE CLINIC | Facility: CLINIC | Age: 68
End: 2023-01-31

## 2023-01-31 NOTE — TELEPHONE ENCOUNTER
Patient is due for annual physical with Dr. Jules Armstrong. Please contact patient to schedule appointment.

## 2023-05-10 ENCOUNTER — TELEPHONE (OUTPATIENT)
Dept: INTERNAL MEDICINE CLINIC | Facility: CLINIC | Age: 68
End: 2023-05-10

## 2023-05-10 NOTE — TELEPHONE ENCOUNTER
Spoke with pt name and  verified, called to address care gaps. Pt states has moved and is no longer seeing dr Judah Demarco.  pcp removal order placed

## 2023-05-17 ENCOUNTER — PATIENT OUTREACH (OUTPATIENT)
Dept: CASE MANAGEMENT | Age: 68
End: 2023-05-17

## 2023-05-17 NOTE — PROCEDURES
The office order for PCP removal request is Approved and finalized on May 17, 2023.     Thanks,  [facility] [employer]

## 2023-08-25 NOTE — TELEPHONE ENCOUNTER
[facility]ed [facility] to have recent lab results faxed. Called pt and scheduled f/u appt. Please advise on refill requests (after seeing lab results). Time spent reviewing imaging, medical management, discussion of care with PMD

## 2024-08-14 NOTE — TELEPHONE ENCOUNTER
Cookie from [facility] calling to get an update from us on the referral sent over to us regarding the patient. I dont remember seeing a referral come in. Is this something you remember seeing?    [phone]   No phone response mailed to patient's address on file.

## (undated) DIAGNOSIS — E78.5 DYSLIPIDEMIA: ICD-10-CM

## (undated) DIAGNOSIS — I10 HYPERTENSION GOAL BP (BLOOD PRESSURE) < 130/80: ICD-10-CM

## (undated) NOTE — LETTER
7/7/2021              Martita Hill        [address]         Dear Jin Alex,    This letter is to inform you that our office has made several attempts to reach you by phone without success.   We were attempting to con

## (undated) NOTE — Clinical Note
Pt said she had labs done at [facility] a couple months ago. can you check to see if they have results?

## (undated) NOTE — LETTER
01/09/20        Martita BLACK Defilippis  [address] [place] [address]  [facility] Telephone Colon Screen      ENT Referral - [facility])    Dear Rod Cheung,    Our records indicate that you have outstanding lab work and or testing that was

## (undated) NOTE — MR AVS SNAPSHOT
[facility] [address]  [address] , [address]  [phone]               Thank you for choosing us for your health care visit with ERAN Olvera MD.  We are glad to serve you and happy to provide you with this summary of * Notice: This list has 2 medication(s) that are the same as other medications prescribed for you. Read the directions carefully, and ask your doctor or other care provider to review them with you.          Where to Get Your Medications      These medicat

## (undated) NOTE — LETTER
09/13/21        Martita Francisilippchris  [address]  [facility] [address]      Dear Doretha Joe records indicate that you have outstanding lab work and or testing that was ordered for you and has not yet been completed:  Orders Placed This Encounter

## (undated) NOTE — MR AVS SNAPSHOT
[facility] [address]  [address], [address]  [phone]               Thank you for choosing us for your health care visit with ERAN Sargent MD.  We are glad to serve you and happy to provide you with this summary of These medications were sent to [facility], [address] [phone], [phone]  [address] [place] [address] Drive, [address], [facility] [address]     Phone:  [phone]    - amoxicillin 875 MG Tabs  - benzonatate 200 MG Caps            [facility]

## (undated) NOTE — LETTER
10/29/20        Martita Hill  [address]  [facility] [address]      Dear Meredith Cartagena records indicate that you have outstanding lab work and or testing that was ordered for you and has not yet been completed:  Orders Placed This Encounter

## (undated) NOTE — LETTER
04/16/20        Martita Hill  [address] [place] [address]      Dear Bertha Dakins records indicate that you have outstanding lab work and or testing that was ordered for you and has not yet been completed:  Orders Placed This Encounter